# Patient Record
Sex: MALE | URBAN - METROPOLITAN AREA
[De-identification: names, ages, dates, MRNs, and addresses within clinical notes are randomized per-mention and may not be internally consistent; named-entity substitution may affect disease eponyms.]

---

## 2017-05-16 ENCOUNTER — IMPORTED ENCOUNTER (OUTPATIENT)
Dept: URBAN - METROPOLITAN AREA CLINIC 1 | Facility: CLINIC | Age: 72
End: 2017-05-16

## 2017-05-16 PROBLEM — H04.123: Noted: 2017-05-16

## 2017-05-16 PROBLEM — H16.143: Noted: 2017-05-16

## 2017-05-16 PROBLEM — H40.013: Noted: 2017-05-16

## 2017-05-16 PROBLEM — H25.813: Noted: 2017-05-16

## 2017-05-16 PROCEDURE — 92250 FUNDUS PHOTOGRAPHY W/I&R: CPT

## 2017-05-16 PROCEDURE — 92004 COMPRE OPH EXAM NEW PT 1/>: CPT

## 2017-05-16 PROCEDURE — 92015 DETERMINE REFRACTIVE STATE: CPT

## 2017-05-16 NOTE — PATIENT DISCUSSION
1.  CANDI w/ PEK OU- Recommend ATs QID OU (sample of Systane Balance given). Cosnider Restasis/Xiidra 2. Cataract OU: Observe for now without intervention. The patient was advised to contact us if any change or worsening of vision3. Glaucoma Suspect OU (CD 0.70/0.75): IOP 17 OU today. AA. Baseline disc photos done today showed disccupping OU. Patient is considered Low Risk. Condition was discussed with patient and patient understands. Will continue to monitor patient for any progression in condition. Patient was advised to call us with any problems questions or concerns. MRX for glasses given patient states he wanted a glasses prescription since over the counter readers were not helpful enoughReturn for an appointment in 3 weeks 10/OCT/24-2 VF and tear lab with Dr. Barbara Bedolla.

## 2022-04-02 ASSESSMENT — TONOMETRY
OS_IOP_MMHG: 17
OD_IOP_MMHG: 17

## 2022-04-02 ASSESSMENT — VISUAL ACUITY
OS_CC: 20/50
OD_CC: 20/30-2